# Patient Record
Sex: MALE | Race: WHITE | NOT HISPANIC OR LATINO | Employment: OTHER | URBAN - METROPOLITAN AREA
[De-identification: names, ages, dates, MRNs, and addresses within clinical notes are randomized per-mention and may not be internally consistent; named-entity substitution may affect disease eponyms.]

---

## 2018-09-13 ENCOUNTER — HOSPITAL ENCOUNTER (OUTPATIENT)
Dept: FAMILY MEDICINE CLINIC | Facility: CLINIC | Age: 63
Discharge: HOME | End: 2018-09-13
Attending: FAMILY MEDICINE | Admitting: FAMILY MEDICINE

## 2021-03-23 ENCOUNTER — OFFICE VISIT (OUTPATIENT)
Dept: FAMILY MEDICINE CLINIC | Facility: CLINIC | Age: 66
End: 2021-03-23

## 2021-03-23 VITALS
BODY MASS INDEX: 26.63 KG/M2 | WEIGHT: 190.2 LBS | HEIGHT: 71 IN | DIASTOLIC BLOOD PRESSURE: 80 MMHG | SYSTOLIC BLOOD PRESSURE: 169 MMHG | TEMPERATURE: 97.5 F | HEART RATE: 66 BPM | OXYGEN SATURATION: 98 %

## 2021-03-23 DIAGNOSIS — Z80.42 FH: PROSTATE CANCER: ICD-10-CM

## 2021-03-23 DIAGNOSIS — J30.1 SEASONAL ALLERGIC RHINITIS DUE TO POLLEN: Primary | ICD-10-CM

## 2021-03-23 DIAGNOSIS — R03.0 ELEVATED BLOOD PRESSURE READING WITHOUT DIAGNOSIS OF HYPERTENSION: ICD-10-CM

## 2021-03-23 DIAGNOSIS — N13.8 BENIGN PROSTATIC HYPERPLASIA WITH URINARY OBSTRUCTION: ICD-10-CM

## 2021-03-23 DIAGNOSIS — N50.9 TESTICLE TROUBLE: ICD-10-CM

## 2021-03-23 DIAGNOSIS — N40.1 BENIGN PROSTATIC HYPERPLASIA WITH URINARY OBSTRUCTION: ICD-10-CM

## 2021-03-23 DIAGNOSIS — N32.9 BLADDER PROBLEM: ICD-10-CM

## 2021-03-23 DIAGNOSIS — Z12.11 COLON CANCER SCREENING: ICD-10-CM

## 2021-03-23 PROCEDURE — 99204 OFFICE O/P NEW MOD 45 MIN: CPT | Performed by: FAMILY MEDICINE

## 2021-03-23 RX ORDER — TERAZOSIN 5 MG/1
5 CAPSULE ORAL NIGHTLY
COMMUNITY
Start: 2021-01-20

## 2021-03-23 RX ORDER — LEVOCETIRIZINE DIHYDROCHLORIDE 5 MG/1
5 TABLET, FILM COATED ORAL EVERY EVENING
COMMUNITY

## 2021-03-23 RX ORDER — MULTIPLE VITAMINS W/ MINERALS TAB 9MG-400MCG
1 TAB ORAL DAILY
COMMUNITY
Start: 2018-03-30

## 2021-03-23 RX ORDER — MONTELUKAST SODIUM 10 MG/1
10 TABLET ORAL NIGHTLY
Qty: 90 TABLET | Refills: 1 | Status: SHIPPED | OUTPATIENT
Start: 2021-03-23 | End: 2022-04-05 | Stop reason: SDUPTHER

## 2021-03-23 NOTE — PROGRESS NOTES
Subjective   Avila Pimentel is a 65 y.o. male.   Chief Complaint   Patient presents with   • Sinusitis       History of Present Illness   Patient presents as a new patient. Patient states he has a lot of throat drainage. Patient coughs up clear phlegm.  Above reviewed and verified.  His last doctor was Dr. Veras.  Last saw her 2 and half years ago.  He continues to follow annually with Dr. Kincaid because of BPH and some other bladder problems I am not fully clear on.  Both his father and brother had prostate cancer so he is screened routinely by Dr. Kincaid for that.    He probably has borderline hypertension.  He has been told before his blood pressure was up, but it was not anything that he should treat yet.    His left testicle is slightly larger than the right.  It has been that way for 20 years he says.  Does not really bother him except sometimes the left one aches.    Nasal congestion, throat drainage and coughing.  This is been a problem for a long time.  [place] the symptoms improve quite a bit although they do not fully resolved.  He takes Xyzal daily.    He has never had a colonoscopy.      Patient Active Problem List    Diagnosis Date Noted   • Bladder problem 03/23/2021     Note Last Updated: 3/23/2021     Treated by DR. Kincaid - rare problem - fat inside the body where it's not supposed to be.     • FH: prostate cancer 03/23/2021     Note Last Updated: 3/23/2021     Father and brother.     • Seasonal allergic rhinitis due to pollen 03/23/2021   • Elevated blood pressure reading without diagnosis of hypertension 03/23/2021   • Benign prostatic hyperplasia with urinary obstruction 11/08/2013     Note Last Updated: 3/23/2021     Yearly checks with DR. Kincaid - last PSA was 2.7.    Had a biopsy once - it was negative.             Past Surgical History:   Procedure Laterality Date   • BLADDER SURGERY       Current Outpatient Medications on File Prior to Visit   Medication Sig   •  "levocetirizine (XYZAL) 5 MG tablet Take 5 mg by mouth Every Evening.   • multivitamin with minerals (Multivitamin Adults 50+) tablet tablet MULTIVITAMIN ADULTS 50+ TABS   • terazosin (HYTRIN) 5 MG capsule      No current facility-administered medications on file prior to visit.     No Known Allergies  Social History     Socioeconomic History   • Marital status:      Spouse name: Not on file   • Number of children: Not on file   • Years of education: Not on file   • Highest education level: Not on file   Tobacco Use   • Smoking status: Never Smoker   • Smokeless tobacco: Never Used     History reviewed. No pertinent family history.    Review of Systems    Objective   /80 (BP Location: Left arm, Patient Position: Sitting, Cuff Size: Adult)   Pulse 66   Temp 97.5 °F (36.4 °C) (Infrared)   Ht 180.3 cm (71\")   Wt 86.3 kg (190 lb 3.2 oz)   SpO2 98%   BMI 26.53 kg/m²   Physical Exam  Constitutional:       General: He is not in acute distress.     Appearance: He is well-developed.      Comments: Wearing a face mask     HENT:      Head: Normocephalic and atraumatic.      Nose: Congestion present.   Eyes:      Conjunctiva/sclera: Conjunctivae normal.   Cardiovascular:      Rate and Rhythm: Normal rate and regular rhythm.      Heart sounds: No murmur heard.     Pulmonary:      Effort: Pulmonary effort is normal. No respiratory distress.      Breath sounds: Normal breath sounds.   Genitourinary:     Comments: Left testicle does appear to be slightly larger than the right.  There appears to be a little bit of fluid surrounding it as well.  Both testicles are descended.  There are no visible abnormalities on the scrotum.  No hard palpable masses within either testicle.  Musculoskeletal:         General: Normal range of motion.      Cervical back: Normal range of motion.      Right lower leg: No edema.      Left lower leg: No edema.   Skin:     General: Skin is warm and dry.      Findings: No rash. "   Neurological:      Mental Status: He is alert and oriented to person, place, and time.   Psychiatric:         Behavior: Behavior normal.           No visits with results within 4 Month(s) from this visit.   Latest known visit with results is:   Office Visit Converted on 03/30/2018   Component Date Value Ref Range Status   • Albumin 03/30/2018 4.3  3.6 - 5.1 g/dL Final   • Alkaline Phosphatase 03/30/2018 58  40 - 115 units/L Final   • Basophil Rel % 03/30/2018 1  % Final   • Glucose 03/30/2018 105* 65 - 99 mg/dL Final   • Total Bilirubin 03/30/2018 0.4  0.2 - 1.2 mg/dL Final   • BUN 03/30/2018 26* 7 - 25 mg/dL Final   • BUN/Creatinine Ratio 03/30/2018 23.6 (calc)* 6 - 22 Final   • Calcium 03/30/2018 9.9  8.6 - 10.2 mg/dL Final   • Chloride 03/30/2018 105  98 - 110 mmol/L Final   • Chol/HDL Ratio 03/30/2018 3.6 (calc)  <5.0 Final   • Total Cholesterol 03/30/2018 180  <200 mg/dL Final   • CO2 03/30/2018 28  21 - 33 mmol/L Final   • Creatinine 03/30/2018 1.1  0.76 - 1.46 mg/dL Final   • eGFR African Am 03/30/2018 >60 mL/min/1.73m2  >59 mL/min/1.73m2 Final   • eGFR Non African Am 03/30/2018 >60 mL/min/1.73m2  >59 mL/min/1.73m2 Final   • Eosinophils Absolute 03/30/2018 100 CELLS/UL  15 - 500 10*3/mm3 Final   • Eosinophil Rel % 03/30/2018 1  % Final   • Globulin 03/30/2018 2.6 G/DL (CALC)  2.1 - 3.7 g/dL Final   • Hematocrit 03/30/2018 48.1  38.5 - 50.0 % Final   • HDL Cholesterol 03/30/2018 50  >40 mg/dL Final   • Hemoglobin 03/30/2018 16.0  13.2 - 17.1 g/dL Final   • LDL Cholesterol  03/30/2018 108 MG/DL (CALC)* mg/dL Final   • Lymphocytes Absolute 03/30/2018 1400 CELLS/UL  850 - 3900 10*3/mm3 Final   • Lymphocyte Rel % 03/30/2018 25  % Final   • MCH 03/30/2018 30.0  27.0 - 33.0 pg Final   • MCHC 03/30/2018 33.3 G/DL  32.0 - 36.0 % Final   • MCV 03/30/2018 90.2  80.0 - 100.0 fL Final   • Monocyte Rel % 03/30/2018 6  % Final   • Monocytes Absolute 03/30/2018 400 CELLS/UL  200 - 950 10*3/microliter Final   • MPV  03/30/2018 8.3  7.5 - 11.5 fL Final   • Neutrophils Absolute 03/30/2018 3900 CELLS/UL  1500 - 7800 10*3/mm3 Final   • Platelets 03/30/2018 257 THOUSAND/UL  140 - 400 10*3/mm3 Final   • Neutrophils, Fluid 03/30/2018 67  % Final   • Potassium 03/30/2018 4.8  3.5 - 5.3 mmol/L Final   • Total Protein 03/30/2018 6.9  6.2 - 8.3 g/dL Final   • PSA 03/30/2018 2.2  < OR = 4.0 ng/mL Final   • RBC 03/30/2018 5.34 MILLION/UL  4.20 - 5.80 10*6/mm3 Final   • RDW 03/30/2018 12.5  11.0 - 15.0 % Final   • AST (SGOT) 03/30/2018 25  10 - 35 units/L Final   • ALT (SGPT) 03/30/2018 23  9 - 60 units/L Final   • Sodium 03/30/2018 140  135 - 146 mmol/L Final   • Triglycerides 03/30/2018 112  <150 mg/dL Final   • WBC 03/30/2018 5.7 THOUSAND/UL  3.8 - 10.8 10*3/mm3 Final   • A/G Ratio 03/30/2018 1.7 (calc)  1.0 - 2.1 Final         Assessment/Plan   Diagnoses and all orders for this visit:    1. Seasonal allergic rhinitis due to pollen (Primary)  -     montelukast (Singulair) 10 MG tablet; Take 1 tablet by mouth Every Night.  Dispense: 90 tablet; Refill: 1    2. Elevated blood pressure reading without diagnosis of hypertension    3. Benign prostatic hyperplasia with urinary obstruction    4. FH: prostate cancer    5. Bladder problem    6. Testicle trouble    7. Colon cancer screening  -     Ambulatory Referral For Screening Colonoscopy    Will add Singulair to his daily regimen of Xyzal.  If symptoms not controlled he can start over-the-counter Nasacort.  If symptoms are still not controlled, then he should add temporarily Mucinex and use of a Pigeon pot.  Asymmetrical testicles-I think he has a little fluid around the left testicle.  Possibly a small hydrocele.  He is not ready to do an ultrasound or have surgery on this.  If it bothers her more he will let me know.  Elevated blood pressure-get a blood pressure monitor and check his blood pressure at home 2-3 times a week.  If numbers stay above 140/90 it will be time to treat that.  We will  plan on getting blood work when he comes back in for blood pressure evaluation.  Prostate problems-keep follow-up with Dr. Kincaid per his instructions.  We will make a referral for screening colonoscopy.  Follow-up here at least in 1 year.  If we do not see him before then, we will get labs at next visit presuming we see him sooner, will get labs at that time.           Return in about 1 year (around 3/23/2022), or if symptoms worsen or fail to improve.    Call with any problems or concerns before next visit

## 2021-05-21 ENCOUNTER — OFFICE (AMBULATORY)
Dept: URBAN - METROPOLITAN AREA PATHOLOGY 4 | Facility: PATHOLOGY | Age: 66
End: 2021-05-21
Payer: COMMERCIAL

## 2021-05-21 ENCOUNTER — OFFICE (AMBULATORY)
Dept: URBAN - METROPOLITAN AREA PATHOLOGY 4 | Facility: PATHOLOGY | Age: 66
End: 2021-05-21

## 2021-05-21 ENCOUNTER — ON CAMPUS - OUTPATIENT (AMBULATORY)
Dept: URBAN - METROPOLITAN AREA HOSPITAL 2 | Facility: HOSPITAL | Age: 66
End: 2021-05-21

## 2021-05-21 VITALS
HEART RATE: 58 BPM | HEIGHT: 71 IN | RESPIRATION RATE: 18 BRPM | HEART RATE: 60 BPM | TEMPERATURE: 97.1 F | DIASTOLIC BLOOD PRESSURE: 71 MMHG | SYSTOLIC BLOOD PRESSURE: 118 MMHG | HEART RATE: 70 BPM | WEIGHT: 186 LBS | SYSTOLIC BLOOD PRESSURE: 114 MMHG | SYSTOLIC BLOOD PRESSURE: 107 MMHG | DIASTOLIC BLOOD PRESSURE: 67 MMHG | DIASTOLIC BLOOD PRESSURE: 92 MMHG | SYSTOLIC BLOOD PRESSURE: 113 MMHG | RESPIRATION RATE: 16 BRPM | DIASTOLIC BLOOD PRESSURE: 75 MMHG | OXYGEN SATURATION: 95 % | SYSTOLIC BLOOD PRESSURE: 116 MMHG | SYSTOLIC BLOOD PRESSURE: 146 MMHG | HEART RATE: 64 BPM | DIASTOLIC BLOOD PRESSURE: 84 MMHG | HEART RATE: 65 BPM | SYSTOLIC BLOOD PRESSURE: 129 MMHG | HEART RATE: 62 BPM | OXYGEN SATURATION: 100 % | OXYGEN SATURATION: 93 % | DIASTOLIC BLOOD PRESSURE: 64 MMHG | DIASTOLIC BLOOD PRESSURE: 72 MMHG | OXYGEN SATURATION: 99 % | HEART RATE: 54 BPM | OXYGEN SATURATION: 98 % | OXYGEN SATURATION: 96 %

## 2021-05-21 DIAGNOSIS — K57.30 DIVERTICULOSIS OF LARGE INTESTINE WITHOUT PERFORATION OR ABS: ICD-10-CM

## 2021-05-21 DIAGNOSIS — K64.1 SECOND DEGREE HEMORRHOIDS: ICD-10-CM

## 2021-05-21 DIAGNOSIS — D12.0 BENIGN NEOPLASM OF CECUM: ICD-10-CM

## 2021-05-21 DIAGNOSIS — K63.89 OTHER SPECIFIED DISEASES OF INTESTINE: ICD-10-CM

## 2021-05-21 DIAGNOSIS — Z12.11 ENCOUNTER FOR SCREENING FOR MALIGNANT NEOPLASM OF COLON: ICD-10-CM

## 2021-05-21 PROBLEM — K63.5 POLYP OF COLON: Status: ACTIVE | Noted: 2021-05-21

## 2021-05-21 LAB
GI HISTOLOGY: A. UNSPECIFIED: (no result)
GI HISTOLOGY: PDF REPORT: (no result)

## 2021-05-21 PROCEDURE — 88305 TISSUE EXAM BY PATHOLOGIST: CPT | Mod: 26 | Performed by: INTERNAL MEDICINE

## 2021-05-21 PROCEDURE — 45385 COLONOSCOPY W/LESION REMOVAL: CPT | Mod: PT | Performed by: INTERNAL MEDICINE

## 2022-04-05 ENCOUNTER — TELEPHONE (OUTPATIENT)
Dept: FAMILY MEDICINE CLINIC | Facility: CLINIC | Age: 67
End: 2022-04-05

## 2022-04-05 ENCOUNTER — OFFICE VISIT (OUTPATIENT)
Dept: FAMILY MEDICINE CLINIC | Facility: CLINIC | Age: 67
End: 2022-04-05

## 2022-04-05 VITALS
OXYGEN SATURATION: 97 % | SYSTOLIC BLOOD PRESSURE: 132 MMHG | HEART RATE: 83 BPM | TEMPERATURE: 98.4 F | DIASTOLIC BLOOD PRESSURE: 80 MMHG

## 2022-04-05 DIAGNOSIS — J30.1 SEASONAL ALLERGIC RHINITIS DUE TO POLLEN: ICD-10-CM

## 2022-04-05 DIAGNOSIS — J01.00 ACUTE NON-RECURRENT MAXILLARY SINUSITIS: Primary | ICD-10-CM

## 2022-04-05 DIAGNOSIS — R05.9 COUGH: ICD-10-CM

## 2022-04-05 PROCEDURE — 99213 OFFICE O/P EST LOW 20 MIN: CPT | Performed by: NURSE PRACTITIONER

## 2022-04-05 RX ORDER — BROMPHENIRAMINE MALEATE, PSEUDOEPHEDRINE HYDROCHLORIDE, AND DEXTROMETHORPHAN HYDROBROMIDE 2; 30; 10 MG/5ML; MG/5ML; MG/5ML
5 SYRUP ORAL 4 TIMES DAILY PRN
Qty: 75 ML | Refills: 0 | Status: SHIPPED | OUTPATIENT
Start: 2022-04-05 | End: 2023-02-17

## 2022-04-05 RX ORDER — AMOXICILLIN AND CLAVULANATE POTASSIUM 875; 125 MG/1; MG/1
1 TABLET, FILM COATED ORAL 2 TIMES DAILY
Qty: 14 TABLET | Refills: 0 | Status: SHIPPED | OUTPATIENT
Start: 2022-04-05 | End: 2023-02-17

## 2022-04-05 RX ORDER — MONTELUKAST SODIUM 10 MG/1
10 TABLET ORAL NIGHTLY
Qty: 90 TABLET | Refills: 1 | Status: SHIPPED | OUTPATIENT
Start: 2022-04-05 | End: 2023-02-17

## 2022-04-05 NOTE — PROGRESS NOTES
Chief Complaint  Sinusitis    Subjective          Avila Pimentel presents to [facility]      History of Present Illness    Avila is a 66-year-old patient who presents today with acute sinusitis symptoms.  He reports that his symptoms began Friday with productive yellow cough, headache, and sinus pressure.  He denies fevers, chills, N/V/D, or shortness of breath.  He has taken several over-the-counter medications without any relief.  Additionally, he has been out of his Singulair.    Patient was tested for flu and Covid and both were negative.    Objective     Vital Signs:   /80 (BP Location: Left arm, Patient Position: Sitting, Cuff Size: Adult)   Pulse 83   Temp 98.4 °F (36.9 °C) (Oral)   SpO2 97%           Physical Exam  Vitals reviewed.   Constitutional:       Appearance: Normal appearance. He is well-developed.      Comments: Wearing a face mask     HENT:      Head: Normocephalic and atraumatic.      Right Ear: Tympanic membrane, ear canal and external ear normal.      Left Ear: Tympanic membrane, ear canal and external ear normal.      Nose: Septal deviation, congestion and rhinorrhea present. Rhinorrhea is purulent.      Right Turbinates: Enlarged.      Left Turbinates: Enlarged.      Right Sinus: Maxillary sinus tenderness and frontal sinus tenderness present.      Left Sinus: Maxillary sinus tenderness and frontal sinus tenderness present.      Mouth/Throat:      Mouth: Mucous membranes are moist.      Pharynx: Oropharynx is clear.      Comments: Thick PND noted    Eyes:      Conjunctiva/sclera: Conjunctivae normal.   Cardiovascular:      Rate and Rhythm: Normal rate and regular rhythm.      Pulses: Normal pulses.      Heart sounds: Normal heart sounds.   Pulmonary:      Effort: Pulmonary effort is normal. No respiratory distress.      Breath sounds: Normal breath sounds.   Musculoskeletal:         General: Normal range of motion.      Cervical back: Normal  range of motion and neck supple. No tenderness.   Skin:     General: Skin is warm and dry.      Findings: No rash.   Neurological:      Mental Status: He is alert and oriented to person, place, and time.   Psychiatric:         Behavior: Behavior normal.                Result Review :                                   Assessment and Plan      Diagnoses and all orders for this visit:    1. Acute non-recurrent maxillary sinusitis (Primary)    2. Seasonal allergic rhinitis due to pollen  -     montelukast (Singulair) 10 MG tablet; Take 1 tablet by mouth Every Night.  Dispense: 90 tablet; Refill: 1    3. Cough    Other orders  -     amoxicillin-clavulanate (Augmentin) 875-125 MG per tablet; Take 1 tablet by mouth 2 (Two) Times a Day.  Dispense: 14 tablet; Refill: 0  -     brompheniramine-pseudoephedrine-DM 30-2-10 MG/5ML syrup; Take 5 mL by mouth 4 (Four) Times a Day As Needed for Allergies.  Dispense: 75 mL; Refill: 0            Follow Up       No follow-ups on file.      Patient was given instructions and counseling regarding his condition or for health maintenance advice. Please see specific information pulled into the AVS if appropriate.     Carol Pantoja, APRN4/5/202214:39 EDT  This note has been electronically signed

## 2022-04-05 NOTE — TELEPHONE ENCOUNTER
CALLED PATIENT AND LET THEM KNOW THAT THEY NEED TO BE SEEN SO I MADE HER AN APPOINTMENT TODAY TO SEE MERVIN BELLO

## 2022-04-05 NOTE — TELEPHONE ENCOUNTER
Caller: ROSSI WEEKS    Relationship: Emergency Contact    Best call back number: [phone] (M)    What medication are you requesting ANTIBIOTIC :SINUS INFECTION     What are your current symptoms: COUGH & CONGESTION    How long have you been experiencing symptoms:      Have you had these symptoms before:    [] Yes  [] No    Have you been treated for these symptoms before:   [] Yes  [] No    If a prescription is needed, what is your preferred pharmacy and phone number:  [id number] - [place], [place] - [address] AT [address] - [phone] [facility] [phone] FX        Additional notes: PLEASE CONTACT ROSSI TO ADVISE.         THANKS

## 2023-02-17 ENCOUNTER — TELEPHONE (OUTPATIENT)
Dept: FAMILY MEDICINE CLINIC | Facility: CLINIC | Age: 68
End: 2023-02-17
Payer: MEDICARE

## 2023-02-17 RX ORDER — TIZANIDINE 4 MG/1
4 TABLET ORAL EVERY 8 HOURS PRN
Qty: 21 TABLET | Refills: 0 | Status: SHIPPED | OUTPATIENT
Start: 2023-02-17 | End: 2023-03-15

## 2023-02-17 RX ORDER — IBUPROFEN 600 MG/1
600 TABLET ORAL EVERY 6 HOURS PRN
COMMUNITY
Start: 2023-02-10 | End: 2023-02-17 | Stop reason: SDUPTHER

## 2023-02-17 RX ORDER — METHOCARBAMOL 500 MG/1
500 TABLET, FILM COATED ORAL EVERY 8 HOURS PRN
Qty: 21 TABLET | Refills: 1 | Status: SHIPPED | OUTPATIENT
Start: 2023-02-17 | End: 2023-02-17

## 2023-02-17 RX ORDER — IBUPROFEN 600 MG/1
600 TABLET ORAL EVERY 6 HOURS PRN
Qty: 28 TABLET | Refills: 1 | Status: SHIPPED | OUTPATIENT
Start: 2023-02-17 | End: 2023-03-15

## 2023-02-17 RX ORDER — METHOCARBAMOL 500 MG/1
500 TABLET, FILM COATED ORAL EVERY 8 HOURS PRN
COMMUNITY
Start: 2023-02-10 | End: 2023-02-17 | Stop reason: SDUPTHER

## 2023-02-17 NOTE — TELEPHONE ENCOUNTER
Pt fell down a flight of stairs on 2/10/23.  Was taken to [facility].  (I have discharge summary, put in folder)  The released w/ Ibuprofen 600mg Q 6hrs and Methocarbamol 500mg Q 8 hrs.  Pt has appt on Tues, 2/21/23.  Pt wants to know if he can have refills of both medications until he is seen.  Please advise.      alana Collins

## 2023-02-17 NOTE — TELEPHONE ENCOUNTER
Please let them know that I have refilled these medications.  I look forward to seeing him on Tuesday.  Thanks!

## 2023-02-21 ENCOUNTER — OFFICE VISIT (OUTPATIENT)
Dept: FAMILY MEDICINE CLINIC | Facility: CLINIC | Age: 68
End: 2023-02-21
Payer: MEDICARE

## 2023-02-21 VITALS
DIASTOLIC BLOOD PRESSURE: 88 MMHG | BODY MASS INDEX: 28.61 KG/M2 | HEIGHT: 71 IN | SYSTOLIC BLOOD PRESSURE: 170 MMHG | TEMPERATURE: 97.7 F | OXYGEN SATURATION: 96 % | WEIGHT: 204.4 LBS | HEART RATE: 69 BPM

## 2023-02-21 DIAGNOSIS — S22.42XS CLOSED FRACTURE OF MULTIPLE RIBS OF LEFT SIDE, SEQUELA: ICD-10-CM
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
## 2023-02-21 DIAGNOSIS — S42.115S CLOSED NONDISPLACED FRACTURE OF BODY OF LEFT SCAPULA, SEQUELA: ICD-10-CM

## 2023-02-21 DIAGNOSIS — M25.552 LEFT HIP PAIN: ICD-10-CM

## 2023-02-21 PROBLEM — S42.115A CLOSED NONDISPLACED FRACTURE OF BODY OF LEFT SCAPULA: Status: ACTIVE | Noted: 2023-02-21
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
## 2023-02-21 PROCEDURE — 99214 OFFICE O/P EST MOD 30 MIN: CPT | Performed by: FAMILY MEDICINE

## 2023-02-21 RX ORDER — AZITHROMYCIN 250 MG/1
TABLET, FILM COATED ORAL
Qty: 6 TABLET | Refills: 0 | Status: SHIPPED | OUTPATIENT
Start: 2023-02-21 | End: 2023-03-01

## 2023-02-21 RX ORDER — HYDROCODONE BITARTRATE AND ACETAMINOPHEN 5; 325 MG/1; MG/1
1 TABLET ORAL EVERY 4 HOURS PRN
Qty: 30 TABLET | Refills: 0 | Status: SHIPPED | OUTPATIENT
Start: 2023-02-21 | End: 2023-03-15

## 2023-02-21 RX ORDER — GUAIFENESIN 600 MG/1
600 TABLET, EXTENDED RELEASE ORAL 2 TIMES DAILY
Qty: 14 TABLET | Refills: 0 | Status: SHIPPED | OUTPATIENT
Start: 2023-02-21
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
## 2023-03-01 ENCOUNTER — OFFICE VISIT (OUTPATIENT)
Dept: FAMILY MEDICINE CLINIC | Facility: CLINIC | Age: 68
End: 2023-03-01
Payer: MEDICARE

## 2023-03-01 VITALS
TEMPERATURE: 97.6 F | HEART RATE: 76 BPM | WEIGHT: 194.6 LBS | SYSTOLIC BLOOD PRESSURE: 144 MMHG | BODY MASS INDEX: 27.24 KG/M2 | RESPIRATION RATE: 16 BRPM | DIASTOLIC BLOOD PRESSURE: 84 MMHG | OXYGEN SATURATION: 96 % | HEIGHT: 71 IN
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
## 2023-03-01 DIAGNOSIS — S22.42XD CLOSED FRACTURE OF MULTIPLE RIBS OF LEFT SIDE WITH ROUTINE HEALING, SUBSEQUENT ENCOUNTER: ICD-10-CM

## 2023-03-01 DIAGNOSIS — S42.115D CLOSED NONDISPLACED FRACTURE OF BODY OF LEFT SCAPULA WITH ROUTINE HEALING, SUBSEQUENT ENCOUNTER: ICD-10-CM

## 2023-03-01 PROBLEM — R05.9 COUGH: Status: RESOLVED | Noted: 2022-04-05 | Resolved: 2023-03-01

## 2023-03-01 PROBLEM — J30.1 SEASONAL ALLERGIC RHINITIS DUE TO POLLEN: Status: RESOLVED | Noted: 2021-03-23 | Resolved: 2023-03-01

## 2023-03-01 PROBLEM — J01.00 ACUTE NON-RECURRENT MAXILLARY SINUSITIS: Status: RESOLVED | Noted: 2022-04-05 | Resolved: 2023-03-01

## 2023-03-01 PROCEDURE — 99213 OFFICE O/P EST LOW 20 MIN: CPT | Performed by: FAMILY MEDICINE

## 2023-03-01 NOTE — PROGRESS NOTES
Answers for HPI/ROS submitted by the patient on 2/28/2023  What is the primary reason for your visit?: Other  Please describe your symptoms.: Broken ribs and shoulder blade  Have you had these symptoms before?: Yes  How long have you been having these symptoms?: Greater than 2 weeks  Please list any medications you are currently taking for this condition.: Hydrocodone/acetaminophen 5-325TB, Guaifenesin 600mg ER Tablets  Please describe any probable cause for these symptoms. : Fell down flight of stairs    Scott Pimentel is a 67 y.o. male.   Chief Complaint   Patient presents with   • Rib Injury       History of Present Illness   Presents to the office today for follow-up on a recent injury.  Please see last note for details.  I saw him about 9 days ago.  Fell down the stairs in the dark.  Suffered a left scapular fracture and multiple left rib fractures.  Scapular fracture was not visible on the x-ray of the scapula we did last week.  Our x-ray suggested displaced left fourth through eighth rib fractures with small left pleural effusion.  We x-rayed his hip and there is no obvious fracture there.  He was having significant pain so I gave him some hydrocodone, we talked about cough and deep breathing exercises.  He has been doing the cough and deep breathing exercises routinely.  He had an incentive spirometer and uses that frequently as well.  He reports a general slow decrease in the left chest wall pain.  That bothers him a lot more than his left upper back pain.  In fact, unless he leans back real hard against something, it hardly bothers him.  He tells me he is able to get in and out of bed much easier.  More trouble getting out than in, but if he holds his chest and rolls out he is able to do it easily.  He can get in and out of a chair in a car much easier.  He has not had any worsening of the pain, no sudden shortness of breath.    He and his wife have tickets to a concert in 2 weeks and he  asks if I think he will be able to go.      Patient Active Problem List    Diagnosis Date Noted   • Fall down stairs 02/21/2023   • Closed nondisplaced fracture of body of left scapula 02/21/2023   • Bladder problem 03/23/2021     Note Last Updated: 3/23/2021     Treated by DR. Kincaid - rare problem - fat inside the body where it's not supposed to be.     • FH: prostate cancer 03/23/2021     Note Last Updated: 3/23/2021     Father and brother.     • Elevated blood pressure reading without diagnosis of hypertension 03/23/2021   • Benign prostatic hyperplasia with urinary obstruction 11/08/2013     Note Last Updated: 3/23/2021     Yearly checks with DR. Kincaid - last PSA was 2.7.    Had a biopsy once - it was negative.             Past Surgical History:   Procedure Laterality Date   • BLADDER SURGERY     • COLONOSCOPY W/ BIOPSIES  05/21/2021    Dr. Null - diverticula, polyp (tubular adenomaon path), scarring - descending colon.  int & Ext hemorrhoids   • VASECTOMY  1985     Current Outpatient Medications on File Prior to Visit   Medication Sig   • guaiFENesin (Mucinex) 600 MG 12 hr tablet Take 1 tablet by mouth 2 (Two) Times a Day.   • multivitamin with minerals tablet tablet Take 1 tablet by mouth Daily.   • terazosin (HYTRIN) 5 MG capsule Take 1 capsule by mouth Every Night.   • levocetirizine (XYZAL) 5 MG tablet Take 1 tablet by mouth Every Evening.     No current facility-administered medications on file prior to visit.     No Known Allergies  Social History     Socioeconomic History   • Marital status:    Tobacco Use   • Smoking status: Never     Passive exposure: Never   • Smokeless tobacco: Never   Vaping Use   • Vaping Use: Never used   Substance and Sexual Activity   • Alcohol use: Never   • Drug use: Never   • Sexual activity: Yes     Partners: Female     Birth control/protection: Post-menopausal     Family History   Problem Relation Age of Onset   • Cancer Father         Prostate   • Cancer Brother  "        Prostate       Review of Systems    Objective   /84 (BP Location: Right arm, Patient Position: Sitting, Cuff Size: Adult)   Pulse 76   Temp 97.6 °F (36.4 °C) (Infrared)   Resp 16   Ht 180.3 cm (71\")   Wt 88.3 kg (194 lb 9.6 oz)   SpO2 96%   BMI 27.14 kg/m²   Physical Exam  Constitutional:       Appearance: He is well-developed. He is not toxic-appearing.      Comments: Wearing a face mask     HENT:      Head: Normocephalic and atraumatic.   Eyes:      Conjunctiva/sclera: Conjunctivae normal.   Cardiovascular:      Rate and Rhythm: Normal rate.   Pulmonary:      Effort: Pulmonary effort is normal. No respiratory distress.      Breath sounds: No stridor. No wheezing, rhonchi or rales.      Comments: He has tenderness to palpation over the left hemithorax, but he is not as sore as he was at the last visit.  He tends to hold the left side of his chest through most of the visit.  Breath sounds are equal in bilateral lung fields.  Musculoskeletal:         General: Normal range of motion.      Cervical back: Normal range of motion.      Right lower leg: No edema.      Left lower leg: No edema.      Comments: Some tenderness to palpation on the left upper back.  No obvious physical bruising.   Skin:     General: Skin is warm and dry.      Findings: No rash.   Neurological:      Mental Status: He is alert and oriented to person, place, and time.      Gait: Gait normal.   Psychiatric:         Mood and Affect: Mood normal.         Behavior: Behavior normal.           No visits with results within 4 Month(s) from this visit.   Latest known visit with results is:   Office Visit Converted on 03/30/2018   Component Date Value Ref Range Status   • Albumin 03/30/2018 4.3  3.6 - 5.1 g/dL Final   • Alkaline Phosphatase 03/30/2018 58  40 - 115 units/L Final   • Basophil Rel % 03/30/2018 1  % Final   • Glucose 03/30/2018 105 (H)  65 - 99 mg/dL Final   • Total Bilirubin 03/30/2018 0.4  0.2 - 1.2 mg/dL Final   • BUN " 03/30/2018 26 (H)  7 - 25 mg/dL Final   • BUN/Creatinine Ratio 03/30/2018 23.6 (calc) (H)  6 - 22 Final   • Calcium 03/30/2018 9.9  8.6 - 10.2 mg/dL Final   • Chloride 03/30/2018 105  98 - 110 mmol/L Final   • Chol/HDL Ratio 03/30/2018 3.6 (calc)  <5.0 Final   • Total Cholesterol 03/30/2018 180  <200 mg/dL Final   • CO2 03/30/2018 28  21 - 33 mmol/L Final   • Creatinine 03/30/2018 1.1  0.76 - 1.46 mg/dL Final   • eGFR African Am 03/30/2018 >60 mL/min/1.73m2  >59 mL/min/1.73m2 Final   • eGFR Non African Am 03/30/2018 >60 mL/min/1.73m2  >59 mL/min/1.73m2 Final   • Eosinophils Absolute 03/30/2018 100 CELLS/UL  15 - 500 10*3/mm3 Final   • Eosinophil Rel % 03/30/2018 1  % Final   • Globulin 03/30/2018 2.6 G/DL (CALC)  2.1 - 3.7 g/dL Final   • Hematocrit 03/30/2018 48.1  38.5 - 50.0 % Final   • HDL Cholesterol 03/30/2018 50  >40 mg/dL Final   • Hemoglobin 03/30/2018 16.0  13.2 - 17.1 g/dL Final   • LDL Cholesterol  03/30/2018 108 MG/DL (CALC) (H)  mg/dL Final   • Lymphocytes Absolute 03/30/2018 1400 CELLS/UL  850 - 3900 10*3/mm3 Final   • Lymphocyte Rel % 03/30/2018 25  % Final   • MCH 03/30/2018 30.0  27.0 - 33.0 pg Final   • MCHC 03/30/2018 33.3 G/DL  32.0 - 36.0 % Final   • MCV 03/30/2018 90.2  80.0 - 100.0 fL Final   • Monocyte Rel % 03/30/2018 6  % Final   • Monocytes Absolute 03/30/2018 400 CELLS/UL  200 - 950 10*3/microliter Final   • MPV 03/30/2018 8.3  7.5 - 11.5 fL Final   • Neutrophils Absolute 03/30/2018 3900 CELLS/UL  1500 - 7800 10*3/mm3 Final   • Platelets 03/30/2018 257 THOUSAND/UL  140 - 400 10*3/mm3 Final   • Neutrophils, Fluid 03/30/2018 67  % Final   • Potassium 03/30/2018 4.8  3.5 - 5.3 mmol/L Final   • Total Protein 03/30/2018 6.9  6.2 - 8.3 g/dL Final   • PSA 03/30/2018 2.2  < OR = 4.0 ng/mL Final   • RBC 03/30/2018 5.34 MILLION/UL  4.20 - 5.80 10*6/mm3 Final   • RDW 03/30/2018 12.5  11.0 - 15.0 % Final   • AST (SGOT) 03/30/2018 25  10 - 35 units/L Final   • ALT (SGPT) 03/30/2018 23  9 - 60 units/L  Final   • Sodium 03/30/2018 140  135 - 146 mmol/L Final   • Triglycerides 03/30/2018 112  <150 mg/dL Final   • WBC 03/30/2018 5.7 THOUSAND/UL  3.8 - 10.8 10*3/mm3 Final   • A/G Ratio 03/30/2018 1.7 (calc)  1.0 - 2.1 Final         Assessment & Plan   Diagnoses and all orders for this visit:    1. Fall down stairs, subsequent encounter (Primary)  -     Cancel: XR Ribs Left With PA Chest  -     XR ribs 2 vw left    2. Closed fracture of multiple ribs of left side with routine healing, subsequent encounter  -     Cancel: XR Ribs Left With PA Chest  -     XR ribs 2 vw left    3. Closed nondisplaced fracture of body of left scapula with routine healing, subsequent encounter  -     Cancel: XR Ribs Left With PA Chest  -     XR ribs 2 vw left    Overall, Avila is moving a lot better than he was the last visit.  We x-rayed his ribs in the office and I reviewed those with him.  There is some displacement, but he has good breath sounds and he does not have a pneumothorax.  Probably still too early for much callusing to take place.  I explained the process of rib fracture healing to him.  Use pain medicine only if needed at this point.  Continue to do incentive spirometry.  Time will heal these fractures.  He needs to primarily protect the areas to avoid reinjury.  We will see him again in 2 weeks to reevaluate and re-x-ray his ribs.  From my standpoint I think it is okay to go to a concert as long as he is careful while stairclimbing and when in crowds.  Overall, I spent just over 20 minutes on the day of service with Avila discussing all the above, reviewing the images of the x-ray with him and discussing our follow-up plan.        Call with any problems or concerns before next visit       Return in about 2 weeks (around 3/15/2023), or to recheck rib fracture.      Much of this report is an electronic transcription of spoken language to printed text using Dragon dictation software.  As such, the subtleties and finesse of  spoken language may permit erroneous, or at times, nonsensical words or phrases to be inadvertently transcribed; thus changes may be made at a later date to rectify these errors.     Mary Barlow MD3/17/[id number]:15 EDT  This note has been electronically signed

## 2023-03-15 ENCOUNTER — OFFICE VISIT (OUTPATIENT)
Dept: FAMILY MEDICINE CLINIC | Facility: CLINIC | Age: 68
End: 2023-03-15
Payer: MEDICARE

## 2023-03-15 VITALS
SYSTOLIC BLOOD PRESSURE: 132 MMHG | TEMPERATURE: 98 F | OXYGEN SATURATION: 97 % | BODY MASS INDEX: 27.24 KG/M2 | WEIGHT: 194.6 LBS | DIASTOLIC BLOOD PRESSURE: 88 MMHG | HEIGHT: 71 IN | HEART RATE: 82 BPM

## 2023-03-15 DIAGNOSIS — S22.42XD CLOSED FRACTURE OF MULTIPLE RIBS OF LEFT SIDE WITH ROUTINE HEALING, SUBSEQUENT ENCOUNTER: Primary | ICD-10-CM

## 2023-03-15 PROCEDURE — 1159F MED LIST DOCD IN RCRD: CPT | Performed by: FAMILY MEDICINE

## 2023-03-15 PROCEDURE — 99213 OFFICE O/P EST LOW 20 MIN: CPT | Performed by: FAMILY MEDICINE

## 2023-03-15 PROCEDURE — 1160F RVW MEDS BY RX/DR IN RCRD: CPT | Performed by: FAMILY MEDICINE

## 2023-03-15 NOTE — PROGRESS NOTES
Answers for HPI/ROS submitted by the patient on 3/8/2023  What is the primary reason for your visit?: Other  Please describe your symptoms.: Follow up on broken ribs  Have you had these symptoms before?: Yes  How long have you been having these symptoms?: Greater than 2 weeks  Please list any medications you are currently taking for this condition.: Mucinex, Advil  Please describe any probable cause for these symptoms. : Fall down stairs    Scott Pimentel is a 67 y.o. male.   Chief Complaint   Patient presents with   • Rib Injury   • Chest Pain       History of Present Illness   Presents to the office today for follow-up on multiple left rib fractures following a fall down the stairs on February 10.  Please see previous notes for details.  He no longer needs to take the muscle relaxer, anti-inflammatory, hydrocodone.  He is able to move much more comfortably.  He rarely has any pain in his left upper back over his scapula.  He has some minimal tenderness to palpation on the left chest wall.  He has no bruising.    His wife tells me he moves pretty much like normal again.  He is able to get up out of bed in the middle of the night to go to the bathroom and he seems to move comfortably.  He denies any cough, shortness of breath, wheezing.          Patient Active Problem List    Diagnosis Date Noted   • Fall down stairs 02/21/2023   • Closed nondisplaced fracture of body of left scapula 02/21/2023   • Bladder problem 03/23/2021     Note Last Updated: 3/23/2021     Treated by DR. Kincaid - rare problem - fat inside the body where it's not supposed to be.     • FH: prostate cancer 03/23/2021     Note Last Updated: 3/23/2021     Father and brother.     • Elevated blood pressure reading without diagnosis of hypertension 03/23/2021   • Benign prostatic hyperplasia with urinary obstruction 11/08/2013     Note Last Updated: 3/23/2021     Yearly checks with DR. Kincaid - last PSA was 2.7.    Had a biopsy once -  it was negative.             Past Surgical History:   Procedure Laterality Date   • BLADDER SURGERY     • COLONOSCOPY W/ BIOPSIES  05/21/2021    Dr. Null - diverticula, polyp (tubular adenomaon path), scarring - descending colon.  int & Ext hemorrhoids   • VASECTOMY  1985     Current Outpatient Medications on File Prior to Visit   Medication Sig   • guaiFENesin (Mucinex) 600 MG 12 hr tablet Take 1 tablet by mouth 2 (Two) Times a Day.   • levocetirizine (XYZAL) 5 MG tablet Take 1 tablet by mouth Every Evening.   • multivitamin with minerals tablet tablet Take 1 tablet by mouth Daily.   • terazosin (HYTRIN) 5 MG capsule Take 1 capsule by mouth Every Night.   • [DISCONTINUED] HYDROcodone-acetaminophen (Norco) 5-325 MG per tablet Take 1 tablet by mouth Every 4 (Four) Hours As Needed for Moderate Pain.   • [DISCONTINUED] ibuprofen (ADVIL,MOTRIN) 600 MG tablet Take 1 tablet by mouth Every 6 (Six) Hours As Needed for Moderate Pain. for pain   • [DISCONTINUED] Pain Relieving Lidocaine 4 % patch Apply 1 patch topically to the appropriate area as directed Every Other Day.   • [DISCONTINUED] tiZANidine (ZANAFLEX) 4 MG tablet Take 1 tablet by mouth Every 8 (Eight) Hours As Needed for Muscle Spasms.     No current facility-administered medications on file prior to visit.     No Known Allergies  Social History     Socioeconomic History   • Marital status:    Tobacco Use   • Smoking status: Never     Passive exposure: Never   • Smokeless tobacco: Never   Vaping Use   • Vaping Use: Never used   Substance and Sexual Activity   • Alcohol use: Never   • Drug use: Never   • Sexual activity: Yes     Partners: Female     Birth control/protection: Post-menopausal     Family History   Problem Relation Age of Onset   • Cancer Father         Prostate   • Cancer Brother         Prostate       Review of Systems    Objective   /88 (BP Location: Right arm, Patient Position: Sitting, Cuff Size: Adult)   Pulse 82   Temp 98 °F  "(36.7 °C) (Temporal)   Ht 180.3 cm (70.98\")   Wt 88.3 kg (194 lb 9.6 oz)   SpO2 97%   BMI 27.15 kg/m²   Physical Exam  Constitutional:       Appearance: He is well-developed.      Comments: Wearing a face mask     HENT:      Head: Normocephalic and atraumatic.   Eyes:      Conjunctiva/sclera: Conjunctivae normal.   Cardiovascular:      Rate and Rhythm: Normal rate.   Pulmonary:      Effort: Pulmonary effort is normal. No accessory muscle usage, respiratory distress or retractions.      Breath sounds: Normal breath sounds. No decreased air movement. No wheezing, rhonchi or rales.   Chest:      Chest wall: Tenderness present.   Musculoskeletal:         General: Normal range of motion.      Cervical back: Normal range of motion.      Comments: Tenderness to palpation over the left lateral chest wall in the midaxillary line.  No bruising   Skin:     General: Skin is warm and dry.      Findings: No rash.   Neurological:      Mental Status: He is alert and oriented to person, place, and time.      Gait: Gait normal.   Psychiatric:         Mood and Affect: Mood normal.         Behavior: Behavior normal.         Assessment & Plan   Diagnoses and all orders for this visit:    1. Closed fracture of multiple ribs of left side with routine healing, subsequent encounter (Primary)  -     Cancel: XR Ribs Left With PA Chest  -     XR ribs 2 vw left      I repeated x-rays of his left ribs today.  Rib fractures are redemonstrated.  They are mildly displaced.  There is not seem to be evidence of much callus formation yet.  Clinically, he is doing a lot better.  Hardly having any pain.  Able to resume most normal activities.  I did explain to him that it may take up to 3 months or even a bit longer before the bones are fully healed.  During that time he should definitely minimize any activities that would jar his upper body or cause him to have to grab onto something and pull real hard.  We will plan to see him again by the end of " the year.  I expect at this point he will continue to have slow and steady improvement of his left rib pain.  If he has any problems, let me know and I will recheck him.  Overall, it seems that he is doing well enough that we do not need to keep bringing him back but can just rely on his own assessment at home.  I will follow-up with him again when the radiologist official reading is available.  Overall, I spent 20 minutes on the day of service with Avila and his wife discussing all the above.      Call with any problems or concerns before next visit       Return in about 8 months (around 11/15/2023).      Much of this report is an electronic transcription of spoken language to printed text using Dragon dictation software.  As such, the subtleties and finesse of spoken language may permit erroneous, or at times, nonsensical words or phrases to be inadvertently transcribed; thus changes may be made at a later date to rectify these errors.     Mary Barlow MD3/15/[id number]:55 EDT  This note has been electronically signed

## 2023-11-15 ENCOUNTER — OFFICE VISIT (OUTPATIENT)
Dept: FAMILY MEDICINE CLINIC | Facility: CLINIC | Age: 68
End: 2023-11-15
Payer: MEDICARE

## 2023-11-15 VITALS
HEART RATE: 60 BPM | TEMPERATURE: 97.3 F | BODY MASS INDEX: 27.61 KG/M2 | RESPIRATION RATE: 18 BRPM | DIASTOLIC BLOOD PRESSURE: 69 MMHG | WEIGHT: 197.2 LBS | OXYGEN SATURATION: 95 % | HEIGHT: 71 IN | SYSTOLIC BLOOD PRESSURE: 145 MMHG

## 2023-11-15 DIAGNOSIS — Z11.59 NEED FOR HEPATITIS C SCREENING TEST: ICD-10-CM

## 2023-11-15 DIAGNOSIS — Z13.220 SCREENING FOR CHOLESTEROL LEVEL: ICD-10-CM

## 2023-11-15 DIAGNOSIS — N40.1 BENIGN PROSTATIC HYPERPLASIA WITH URINARY OBSTRUCTION: Primary | ICD-10-CM

## 2023-11-15 DIAGNOSIS — J30.1 SEASONAL ALLERGIC RHINITIS DUE TO POLLEN: ICD-10-CM

## 2023-11-15 DIAGNOSIS — Z13.1 SCREENING FOR DIABETES MELLITUS: ICD-10-CM

## 2023-11-15 DIAGNOSIS — R03.0 ELEVATED BLOOD PRESSURE READING WITHOUT DIAGNOSIS OF HYPERTENSION: ICD-10-CM

## 2023-11-15 DIAGNOSIS — N13.8 BENIGN PROSTATIC HYPERPLASIA WITH URINARY OBSTRUCTION: Primary | ICD-10-CM

## 2023-11-15 DIAGNOSIS — Z13.0 SCREENING, ANEMIA, DEFICIENCY, IRON: ICD-10-CM

## 2023-11-15 PROCEDURE — 1159F MED LIST DOCD IN RCRD: CPT | Performed by: FAMILY MEDICINE

## 2023-11-15 PROCEDURE — 1160F RVW MEDS BY RX/DR IN RCRD: CPT | Performed by: FAMILY MEDICINE

## 2023-11-15 PROCEDURE — 99214 OFFICE O/P EST MOD 30 MIN: CPT | Performed by: FAMILY MEDICINE

## 2023-11-15 NOTE — PROGRESS NOTES
Subjective   Avila Pimentel is a 68 y.o. male.   Chief Complaint   Patient presents with    Hypertension    Benign Prostatic Hypertrophy       History of Present Illness   Presents to the office today for follow-up on chronic medical problems for active problem list as below.  I saw him several times between February and March following a fall where he had significant injuries including rib fractures.  Some residual right low back pain.  Very episodic in nature.  Sometimes will be stiff and minimally sore.  There will be spells when it really becomes painful for few seconds and then stops.      He has a history of elevated blood pressures but no diagnosis of hypertension.  Does not take any antihypertensive medicines.  He has BPH and follows with Dr. Kincaid.  He checks his PSA.  He is on Hytrin.  Otherwise he takes some allergy medicines.    Otherwise he feels good.  No chest pains, shortness of breath, nausea, vomiting, diarrhea.  He had a colonoscopy in 2021 by Dr. Null.      Patient Active Problem List    Diagnosis Date Noted    Fall down stairs 02/21/2023    Closed nondisplaced fracture of body of left scapula 02/21/2023    Bladder problem 03/23/2021     Note Last Updated: 3/23/2021     Treated by DR. Kincaid - rare problem - fat inside the body where it's not supposed to be.      FH: prostate cancer 03/23/2021     Note Last Updated: 3/23/2021     Father and brother.      Seasonal allergic rhinitis due to pollen 03/23/2021    Elevated blood pressure reading without diagnosis of hypertension 03/23/2021    Benign prostatic hyperplasia with urinary obstruction 11/08/2013     Note Last Updated: 3/23/2021     Yearly checks with DR. Kincaid - last PSA was 2.7.    Had a biopsy once - it was negative.             Past Surgical History:   Procedure Laterality Date    BLADDER SURGERY      COLONOSCOPY W/ BIOPSIES  05/21/2021    Dr. Null - diverticula, polyp (tubular adenomaon path), scarring - descending colon.  int &  "Ext hemorrhoids    VASECTOMY  1985     Current Outpatient Medications on File Prior to Visit   Medication Sig    levocetirizine (XYZAL) 5 MG tablet Take 1 tablet by mouth Every Evening.    multivitamin with minerals tablet tablet Take 1 tablet by mouth Daily.    terazosin (HYTRIN) 5 MG capsule Take 1 capsule by mouth Every Night.    [DISCONTINUED] guaiFENesin (Mucinex) 600 MG 12 hr tablet Take 1 tablet by mouth 2 (Two) Times a Day.     No current facility-administered medications on file prior to visit.     No Known Allergies  Social History     Socioeconomic History    Marital status:    Tobacco Use    Smoking status: Never     Passive exposure: Never    Smokeless tobacco: Never   Vaping Use    Vaping Use: Never used   Substance and Sexual Activity    Alcohol use: Never    Drug use: Never    Sexual activity: Yes     Partners: Female     Birth control/protection: Post-menopausal     Family History   Problem Relation Age of Onset    Cancer Father         Prostate    Cancer Brother         Prostate       Review of Systems    Objective   /69 (BP Location: Right arm, Patient Position: Sitting, Cuff Size: Large Adult)   Pulse 60   Temp 97.3 °F (36.3 °C) (Infrared)   Resp 18   Ht 180.3 cm (70.98\")   Wt 89.4 kg (197 lb 3.2 oz)   SpO2 95%   BMI 27.52 kg/m²   Physical Exam  Constitutional:       Appearance: He is well-developed. He is not toxic-appearing.      Comments:      HENT:      Head: Normocephalic and atraumatic.   Eyes:      Conjunctiva/sclera: Conjunctivae normal.   Cardiovascular:      Rate and Rhythm: Normal rate and regular rhythm.      Heart sounds: No murmur heard.  Pulmonary:      Effort: Pulmonary effort is normal. No respiratory distress.      Breath sounds: Normal breath sounds.   Musculoskeletal:         General: Normal range of motion.      Cervical back: Normal range of motion.      Right lower leg: No edema.      Left lower leg: No edema.      Comments: No specific areas of " tenderness to palpation over the right lower back.  No current palpable muscle tone.  He is able to demonstrate actually very good range of motion of his low back.  Pain does not radiate down either leg.     Skin:     General: Skin is warm and dry.      Findings: No rash.   Neurological:      Mental Status: He is alert and oriented to person, place, and time.   Psychiatric:         Behavior: Behavior normal.         Assessment & Plan   Diagnoses and all orders for this visit:    1. Benign prostatic hyperplasia with urinary obstruction (Primary)    2. Screening for cholesterol level  -     Lipid Panel; Future    3. Screening for diabetes mellitus  -     Comprehensive Metabolic Panel; Future    4. Need for hepatitis C screening test  -     Hepatitis C Antibody; Future    5. Screening, anemia, deficiency, iron  -     CBC & Differential; Future    6. Elevated blood pressure reading without diagnosis of hypertension    7. Seasonal allergic rhinitis due to pollen    BPH is a chronic problem currently under good control.  No lightheadedness from Hytrin.  Blood pressure today is acceptable at 145/69.  He reports it is a little lower than that at home, as well.  No changes there.    He is screened for prostate cancer with PSAs by his urologist.  To my knowledge he has not had a lipid panel.  Blood sugar was 132 and he had a bad fall and was treated at [facility] back in February.  He is current on colon cancer screening.  He does not smoke.  I recommended he continue his current medicines at current doses.  We will screen him for diabetes, high cholesterol, hepatitis C, anemia with lab work as above.  He is going to come back some morning when he is fasting and do those.  Allergies are well controlled on levocetirizine.  Continue that at current dose.  I will follow-up with him again in 1 year.  I will also follow-up with him when the blood work is back.      Call with any problems or concerns before next visit       Return in  about 1 year (around 11/15/2024), or PLUS labs in the next week or two.      Much of this report is an electronic transcription of spoken language to printed text using Dragon dictation software.  As such, the subtleties and finesse of spoken language may permit erroneous, or at times, nonsensical words or phrases to be inadvertently transcribed; thus changes may be made at a later date to rectify these errors.     Mary Barlow MD11/15/[id number]:16 EST  This note has been electronically signed

## 2023-11-18 LAB
ALBUMIN SERPL-MCNC: 4.3 G/DL (ref 3.9–4.9)
ALBUMIN/GLOB SERPL: 1.6 {RATIO} (ref 1.2–2.2)
ALP SERPL-CCNC: 85 IU/L (ref 44–121)
ALT SERPL-CCNC: 19 IU/L (ref 0–44)
AST SERPL-CCNC: 21 IU/L (ref 0–40)
BASOPHILS # BLD AUTO: 0 X10E3/UL (ref 0–0.2)
BASOPHILS NFR BLD AUTO: 0 %
BILIRUB SERPL-MCNC: 0.5 MG/DL (ref 0–1.2)
BUN SERPL-MCNC: 27 MG/DL (ref 8–27)
BUN/CREAT SERPL: 25 (ref 10–24)
CALCIUM SERPL-MCNC: 9.4 MG/DL (ref 8.6–10.2)
CHLORIDE SERPL-SCNC: 104 MMOL/L (ref 96–106)
CO2 SERPL-SCNC: 23 MMOL/L (ref 20–29)
CREAT SERPL-MCNC: 1.07 MG/DL (ref 0.76–1.27)
EGFRCR SERPLBLD CKD-EPI 2021: 76 ML/MIN/1.73
EOSINOPHIL # BLD AUTO: 0.1 X10E3/UL (ref 0–0.4)
EOSINOPHIL NFR BLD AUTO: 1 %
ERYTHROCYTE [DISTWIDTH] IN BLOOD BY AUTOMATED COUNT: 12.4 % (ref 11.6–15.4)
GLOBULIN SER CALC-MCNC: 2.7 G/DL (ref 1.5–4.5)
GLUCOSE SERPL-MCNC: 95 MG/DL (ref 70–99)
HCT VFR BLD AUTO: 46.9 % (ref 37.5–51)
HCV IGG SERPL QL IA: NON REACTIVE
HGB BLD-MCNC: 15.9 G/DL (ref 13–17.7)
IMM GRANULOCYTES # BLD AUTO: 0 X10E3/UL (ref 0–0.1)
IMM GRANULOCYTES NFR BLD AUTO: 0 %
LYMPHOCYTES # BLD AUTO: 1.9 X10E3/UL (ref 0.7–3.1)
LYMPHOCYTES NFR BLD AUTO: 27 %
MCH RBC QN AUTO: 29.9 PG (ref 26.6–33)
MCHC RBC AUTO-ENTMCNC: 33.9 G/DL (ref 31.5–35.7)
MCV RBC AUTO: 88 FL (ref 79–97)
MONOCYTES # BLD AUTO: 0.5 X10E3/UL (ref 0.1–0.9)
MONOCYTES NFR BLD AUTO: 7 %
NEUTROPHILS # BLD AUTO: 4.5 X10E3/UL (ref 1.4–7)
NEUTROPHILS NFR BLD AUTO: 65 %
PLATELET # BLD AUTO: 266 X10E3/UL (ref 150–450)
POTASSIUM SERPL-SCNC: 4.1 MMOL/L (ref 3.5–5.2)
PROT SERPL-MCNC: 7 G/DL (ref 6–8.5)
RBC # BLD AUTO: 5.31 X10E6/UL (ref 4.14–5.8)
SODIUM SERPL-SCNC: 141 MMOL/L (ref 134–144)
SPECIMEN STATUS: NORMAL
WBC # BLD AUTO: 7 X10E3/UL (ref 3.4–10.8)

## 2024-10-02 ENCOUNTER — OFFICE VISIT (OUTPATIENT)
Dept: FAMILY MEDICINE CLINIC | Facility: CLINIC | Age: 69
End: 2024-10-02
Payer: MEDICARE

## 2024-10-02 VITALS
BODY MASS INDEX: 27.47 KG/M2 | WEIGHT: 196.2 LBS | HEART RATE: 66 BPM | TEMPERATURE: 97.7 F | RESPIRATION RATE: 18 BRPM | DIASTOLIC BLOOD PRESSURE: 80 MMHG | OXYGEN SATURATION: 95 % | SYSTOLIC BLOOD PRESSURE: 173 MMHG | HEIGHT: 71 IN

## 2024-10-02 DIAGNOSIS — R03.0 ELEVATED BLOOD PRESSURE READING WITHOUT DIAGNOSIS OF HYPERTENSION: ICD-10-CM

## 2024-10-02 DIAGNOSIS — Z00.00 PHYSICAL EXAM, ANNUAL: Primary | ICD-10-CM

## 2024-10-02 PROCEDURE — 1170F FXNL STATUS ASSESSED: CPT | Performed by: FAMILY MEDICINE

## 2024-10-02 PROCEDURE — 1125F AMNT PAIN NOTED PAIN PRSNT: CPT | Performed by: FAMILY MEDICINE

## 2024-10-02 PROCEDURE — G0439 PPPS, SUBSEQ VISIT: HCPCS | Performed by: FAMILY MEDICINE

## 2024-10-02 NOTE — PROGRESS NOTES
Subjective   The ABCs of the Annual Wellness Visit  Medicare Wellness Visit      Avila Pimentel is a 69 y.o. patient who presents for a Medicare Wellness Visit.    The following portions of the patient's history were reviewed and   updated as appropriate: allergies, current medications, past family history, past medical history, past social history, past surgical history, and problem list.    Compared to one year ago, the patient's physical   health is the same.  Compared to one year ago, the patient's mental   health is the same.    Recent Hospitalizations:  He was not admitted to the hospital during the last year.     Current Medical Providers:  Patient Care Team:  Mary Barlow MD as PCP - General (Family Medicine)  Enio Kincaid MD as Consulting Physician (Urology)  Scarlet Jacome OD (Optometry)  Peyton Bruce DO (Optometry)    Outpatient Medications Prior to Visit   Medication Sig Dispense Refill    levocetirizine (XYZAL) 5 MG tablet Take 1 tablet by mouth Every Evening.      multivitamin with minerals tablet tablet Take 1 tablet by mouth Daily.      terazosin (HYTRIN) 5 MG capsule Take 1 capsule by mouth Every Night.       No facility-administered medications prior to visit.     No opioid medication identified on active medication list. I have reviewed chart for other potential  high risk medication/s and harmful drug interactions in the elderly.      Aspirin is not on active medication list.  Aspirin use is not indicated based on review of current medical condition/s. Risk of harm outweighs potential benefits.  .    Patient Active Problem List   Diagnosis    Benign prostatic hyperplasia with urinary obstruction    Bladder problem    FH: prostate cancer    Seasonal allergic rhinitis due to pollen    Elevated blood pressure reading without diagnosis of hypertension    Fall down stairs    Closed nondisplaced fracture of body of left scapula     Advance Care Planning Advance Directive  "is not on file.  ACP discussion was held with the patient during this visit. Patient does not have an advance directive, information provided.            Objective   Vitals:    10/02/24 1451   BP: 173/80   BP Location: Right arm   Patient Position: Sitting   Cuff Size: Adult   Pulse: 66   Resp: 18   Temp: 97.7 °F (36.5 °C)   TempSrc: Infrared   SpO2: 95%   Weight: 89 kg (196 lb 3.2 oz)   Height: 180.3 cm (70.98\")       Estimated body mass index is 27.38 kg/m² as calculated from the following:    Height as of this encounter: 180.3 cm (70.98\").    Weight as of this encounter: 89 kg (196 lb 3.2 oz).    BMI is >= 25 and <30. (Overweight) The following options were offered after discussion;: exercise counseling/recommendations and nutrition counseling/recommendations       Does the patient have evidence of cognitive impairment? No                                                                                                Health  Risk Assessment    Smoking Status:  Social History     Tobacco Use   Smoking Status Never    Passive exposure: Never   Smokeless Tobacco Never     Alcohol Consumption:  Social History     Substance and Sexual Activity   Alcohol Use Never       Fall Risk Screen  STEADI Fall Risk Assessment was completed, and patient is at LOW risk for falls.Assessment completed on:10/2/2024    Depression Screening:      10/2/2024     2:50 PM   PHQ-2/PHQ-9 Depression Screening   Little Interest or Pleasure in Doing Things 0-->not at all   Feeling Down, Depressed or Hopeless 0-->not at all   PHQ-9: Brief Depression Severity Measure Score 0     Health Habits and Functional and Cognitive Screening:      10/2/2024     2:47 PM   Functional & Cognitive Status   Do you have difficulty preparing food and eating? No   Do you have difficulty bathing yourself, getting dressed or grooming yourself? No   Do you have difficulty using the toilet? No   Do you have difficulty moving around from place to place? No   Do you have " trouble with steps or getting out of a bed or a chair? No   Current Diet Well Balanced Diet   Dental Exam Up to date   Eye Exam Not up to date   Exercise (times per week) 0 times per week   Current Exercises Include No Regular Exercise   Do you need help using the phone?  No   Are you deaf or do you have serious difficulty hearing?  Yes   Do you need help to go to places out of walking distance? No   Do you need help shopping? No   Do you need help preparing meals?  No   Do you need help with housework?  No   Do you need help with laundry? No   Do you need help taking your medications? No   Do you need help managing money? No   Do you ever drive or ride in a car without wearing a seat belt? No   Have you felt unusual stress, anger or loneliness in the last month? No   Who do you live with? Spouse   If you need help, do you have trouble finding someone available to you? No   Have you been bothered in the last four weeks by sexual problems? No   Do you have difficulty concentrating, remembering or making decisions? Yes           Age-appropriate Screening Schedule:  Refer to the list below for future screening recommendations based on patient's age, sex and/or medical conditions. Orders for these recommended tests are listed in the plan section. The patient has been provided with a written plan.    Health Maintenance List  Health Maintenance   Topic Date Due    TDAP/TD VACCINES (1 - Tdap) Never done    ZOSTER VACCINE (1 of 2) Never done    Pneumococcal Vaccine 65+ (1 of 1 - PCV) Never done    BMI FOLLOWUP  03/01/2024    COVID-19 Vaccine (4 - 2023-24 season) 09/01/2024    INFLUENZA VACCINE  03/31/2025 (Originally 8/1/2024)    ANNUAL WELLNESS VISIT  10/02/2025    COLORECTAL CANCER SCREENING  05/21/2028    HEPATITIS C SCREENING  Completed                                                                                                                                                CMS Preventative Services Quick  "Reference  Risk Factors Identified During Encounter  Immunizations Discussed/Encouraged: Influenza, Prevnar 20 (Pneumococcal 20-valent conjugate), and Shingrix    The above risks/problems have been discussed with the patient.  Pertinent information has been shared with the patient in the After Visit Summary.  An After Visit Summary and PPPS were made available to the patient.    Follow Up:   Next Medicare Wellness visit to be scheduled in 1 year.         Additional E&M Note during same encounter follows:  Patient has additional, significant, and separately identifiable condition(s)/problem(s) that require work above and beyond the Medicare Wellness Visit     Chief Complaint  Medicare Wellness-Initial Visit    Subjective    HPI  Avila is also being seen today for additional medical problem/s.       The patient presents for a Medicare wellness visit.    He reports that his health status remains consistent with the previous year, with no hospitalizations in 2023.    He experienced a fall in late January or early February of 2023, which involved descending approximately 15 to 16 steps.    His blood pressure tends to be slightly elevated. He does not take aspirin.    He recalls receiving influenza vaccines in the past but associates them with subsequent coughing and illness throughout the winter season. He has not received an influenza vaccine recently and is uncertain if he has contracted influenza.    He does not have an advanced directive or living will. He has an appointment scheduled with Dr. Ocampo in January 2025, during which a PSA test is typically conducted.          Objective   Vital Signs:  /80 (BP Location: Right arm, Patient Position: Sitting, Cuff Size: Adult)   Pulse 66   Temp 97.7 °F (36.5 °C) (Infrared)   Resp 18   Ht 180.3 cm (70.98\")   Wt 89 kg (196 lb 3.2 oz)   SpO2 95%   BMI 27.38 kg/m²   Physical Exam  Constitutional:       Appearance: He is well-developed.      Comments:      SIA: "      Head: Normocephalic and atraumatic.   Eyes:      Conjunctiva/sclera: Conjunctivae normal.   Cardiovascular:      Rate and Rhythm: Normal rate.   Pulmonary:      Effort: Pulmonary effort is normal.   Musculoskeletal:         General: Normal range of motion.      Cervical back: Normal range of motion.   Skin:     General: Skin is warm and dry.      Findings: No rash.   Neurological:      Mental Status: He is alert and oriented to person, place, and time.   Psychiatric:         Behavior: Behavior normal.           Vital Signs  Blood pressure measures at 173.             Assessment and Plan           1. Medicare wellness visit.  His cognitive assessment results are within normal limits. From a preventive standpoint, screenings for prostate cancer, colon cancer, high cholesterol, and diabetes are due. His last colonoscopy was performed in 2021, and a follow-up colonoscopy is scheduled for 2028. His vital signs are generally within normal range, with the exception of an elevated blood pressure reading of 173, which could be attributed to whitecoat syndrome, as his previous reading was 145 and his weight has remained stable over the past year. A booklet on advanced directives was provided for him to complete and return. Blood work is scheduled for November 2024, which will include screenings for prostate cancer, cholesterol, and diabetes. The PSA test will be conducted by Urology. An influenza vaccine was recommended for November 2024.    Follow-up  Return in November 2024 for follow up.    No orders of the defined types were placed in this encounter.            Follow Up   No follow-ups on file.  Patient was given instructions and counseling regarding his condition or for health maintenance advice. Please see specific information pulled into the AVS if appropriate.  Patient or patient representative verbalized consent for the use of Ambient Listening during the visit with  Mary Barlow MD for chart  documentation. 10/2/2024  16:46 EDT

## 2024-11-15 ENCOUNTER — OFFICE VISIT (OUTPATIENT)
Dept: FAMILY MEDICINE CLINIC | Facility: CLINIC | Age: 69
End: 2024-11-15
Payer: MEDICARE

## 2024-11-15 VITALS
DIASTOLIC BLOOD PRESSURE: 83 MMHG | RESPIRATION RATE: 18 BRPM | WEIGHT: 193.8 LBS | HEART RATE: 68 BPM | BODY MASS INDEX: 27.13 KG/M2 | SYSTOLIC BLOOD PRESSURE: 169 MMHG | OXYGEN SATURATION: 96 % | TEMPERATURE: 96.8 F | HEIGHT: 71 IN

## 2024-11-15 DIAGNOSIS — N13.8 BENIGN PROSTATIC HYPERPLASIA WITH URINARY OBSTRUCTION: ICD-10-CM

## 2024-11-15 DIAGNOSIS — Z13.0 SCREENING, ANEMIA, DEFICIENCY, IRON: ICD-10-CM

## 2024-11-15 DIAGNOSIS — N40.1 BENIGN PROSTATIC HYPERPLASIA WITH URINARY OBSTRUCTION: ICD-10-CM

## 2024-11-15 DIAGNOSIS — R03.0 ELEVATED BLOOD PRESSURE READING WITHOUT DIAGNOSIS OF HYPERTENSION: Primary | ICD-10-CM

## 2024-11-15 DIAGNOSIS — Z83.430 FAMILY HISTORY OF ELEVATED LIPOPROTEIN(A): ICD-10-CM

## 2024-11-15 DIAGNOSIS — Z13.1 SCREENING FOR DIABETES MELLITUS: ICD-10-CM

## 2024-11-15 PROCEDURE — 1125F AMNT PAIN NOTED PAIN PRSNT: CPT | Performed by: FAMILY MEDICINE

## 2024-11-15 PROCEDURE — 1159F MED LIST DOCD IN RCRD: CPT | Performed by: FAMILY MEDICINE

## 2024-11-15 PROCEDURE — 1160F RVW MEDS BY RX/DR IN RCRD: CPT | Performed by: FAMILY MEDICINE

## 2024-11-15 PROCEDURE — 99214 OFFICE O/P EST MOD 30 MIN: CPT | Performed by: FAMILY MEDICINE

## 2024-11-15 NOTE — PROGRESS NOTES
Subjective   Avila Pimentel is a 69 y.o. male.   Chief Complaint   Patient presents with    Benign Prostatic Hypertrophy       History of Present Illness   69 y.o. male with past medical history of BPH, elevated blood pressures without diagnosis of hypertension presents to the office today to follow-up.  He has Medicare wellness couple months ago.  He saw Dr. Kincaid in January of this year.  We did blood work a year ago to screen him for diabetes and anemia.    He had a colonoscopy in .  PSA levels are followed by Dr. Kincaid.    History of Present Illness  The patient is a 69-year-old male who presents for a wellness visit.    He reports a slight decrease in his blood pressure, but it remains elevated. He does not monitor his blood pressure at home.    He mentions that he did not have a cholesterol blood test last year due to insurance coverage issues. His cholesterol levels were slightly elevated when last checked a few years ago. He is scheduled to have his PSA levels checked by Dr. Kincaid after the start of the new year.    He reports no anxiety.    FAMILY HISTORY  He has a family history of high cholesterol. His father  several years ago. When he was younger, he was a heavy drinker and smoker.      Patient Active Problem List    Diagnosis Date Noted    Fall down stairs 2023    Closed nondisplaced fracture of body of left scapula 2023    Bladder problem 2021     Note Last Updated: 3/23/2021     Treated by DR. Kincaid - rare problem - fat inside the body where it's not supposed to be.      FH: prostate cancer 2021     Note Last Updated: 3/23/2021     Father and brother.      Seasonal allergic rhinitis due to pollen 2021    Elevated blood pressure reading without diagnosis of hypertension 2021    Benign prostatic hyperplasia with urinary obstruction 2013     Note Last Updated: 3/23/2021     Yearly checks with DR. Kincaid - last PSA was 2.7.    Had a biopsy once -  "it was negative.             Past Surgical History:   Procedure Laterality Date    BLADDER SURGERY      COLONOSCOPY W/ BIOPSIES  05/21/2021    Dr. Null - diverticula, polyp (tubular adenomaon path), scarring - descending colon.  int & Ext hemorrhoids    VASECTOMY  1985     Current Outpatient Medications on File Prior to Visit   Medication Sig    levocetirizine (XYZAL) 5 MG tablet Take 1 tablet by mouth Every Evening.    multivitamin with minerals tablet tablet Take 1 tablet by mouth Daily.    terazosin (HYTRIN) 5 MG capsule Take 1 capsule by mouth Every Night.     No current facility-administered medications on file prior to visit.     No Known Allergies  Social History     Socioeconomic History    Marital status:    Tobacco Use    Smoking status: Never     Passive exposure: Never    Smokeless tobacco: Never   Vaping Use    Vaping status: Never Used   Substance and Sexual Activity    Alcohol use: Never    Drug use: Never    Sexual activity: Yes     Partners: Female     Birth control/protection: Post-menopausal     Family History   Problem Relation Age of Onset    Cancer Father         Prostate    Cancer Brother         Prostate       Review of Systems    Objective   /83 (BP Location: Right arm, Patient Position: Sitting, Cuff Size: Large Adult)   Pulse 68   Temp 96.8 °F (36 °C) (Infrared)   Resp 18   Ht 180.3 cm (70.98\")   Wt 87.9 kg (193 lb 12.8 oz)   SpO2 96%   BMI 27.04 kg/m²   Physical Exam  Constitutional:       General: He is not in acute distress.     Appearance: He is well-developed.      Comments:      HENT:      Head: Normocephalic and atraumatic.   Eyes:      Conjunctiva/sclera: Conjunctivae normal.   Cardiovascular:      Rate and Rhythm: Normal rate and regular rhythm.      Heart sounds: No murmur heard.  Pulmonary:      Effort: Pulmonary effort is normal. No respiratory distress.   Musculoskeletal:         General: Normal range of motion.      Cervical back: Normal range of motion. "      Right lower leg: No edema.      Left lower leg: No edema.   Skin:     General: Skin is warm and dry.      Findings: No rash.   Neurological:      Mental Status: He is alert and oriented to person, place, and time.   Psychiatric:         Mood and Affect: Mood normal.         Behavior: Behavior normal.       Physical Exam  Heart has a normal rhythm. No murmur.      No visits with results within 4 Month(s) from this visit.   Latest known visit with results is:   Office Visit on 11/15/2023   Component Date Value Ref Range Status    WBC 11/17/2023 7.0  3.4 - 10.8 x10E3/uL Final    RBC 11/17/2023 5.31  4.14 - 5.80 x10E6/uL Final    Hemoglobin 11/17/2023 15.9  13.0 - 17.7 g/dL Final    Hematocrit 11/17/2023 46.9  37.5 - 51.0 % Final    MCV 11/17/2023 88  79 - 97 fL Final    MCH 11/17/2023 29.9  26.6 - 33.0 pg Final    MCHC 11/17/2023 33.9  31.5 - 35.7 g/dL Final    RDW 11/17/2023 12.4  11.6 - 15.4 % Final    Platelets 11/17/2023 266  150 - 450 x10E3/uL Final    Neutrophil Rel % 11/17/2023 65  Not Estab. % Final    Lymphocyte Rel % 11/17/2023 27  Not Estab. % Final    Monocyte Rel % 11/17/2023 7  Not Estab. % Final    Eosinophil Rel % 11/17/2023 1  Not Estab. % Final    Basophil Rel % 11/17/2023 0  Not Estab. % Final    Neutrophils Absolute 11/17/2023 4.5  1.4 - 7.0 x10E3/uL Final    Lymphocytes Absolute 11/17/2023 1.9  0.7 - 3.1 x10E3/uL Final    Monocytes Absolute 11/17/2023 0.5  0.1 - 0.9 x10E3/uL Final    Eosinophils Absolute 11/17/2023 0.1  0.0 - 0.4 x10E3/uL Final    Basophils Absolute 11/17/2023 0.0  0.0 - 0.2 x10E3/uL Final    Immature Granulocyte Rel % 11/17/2023 0  Not Estab. % Final    Immature Grans Absolute 11/17/2023 0.0  0.0 - 0.1 x10E3/uL Final    Hep C Virus Ab 11/17/2023 Non Reactive  Non Reactive Final    Comment: HCV antibody alone does not differentiate between previously  resolved infection and active infection. Equivocal and Reactive  HCV antibody results should be followed up with an HCV RNA  test  to support the diagnosis of active HCV infection.      Glucose 11/17/2023 95  70 - 99 mg/dL Final    BUN 11/17/2023 27  8 - 27 mg/dL Final    Creatinine 11/17/2023 1.07  0.76 - 1.27 mg/dL Final    EGFR Result 11/17/2023 76  >59 mL/min/1.73 Final    BUN/Creatinine Ratio 11/17/2023 25 (H)  10 - 24 Final    Sodium 11/17/2023 141  134 - 144 mmol/L Final    Potassium 11/17/2023 4.1  3.5 - 5.2 mmol/L Final    Chloride 11/17/2023 104  96 - 106 mmol/L Final    Total CO2 11/17/2023 23  20 - 29 mmol/L Final    Calcium 11/17/2023 9.4  8.6 - 10.2 mg/dL Final    Total Protein 11/17/2023 7.0  6.0 - 8.5 g/dL Final    Albumin 11/17/2023 4.3  3.9 - 4.9 g/dL Final    Globulin 11/17/2023 2.7  1.5 - 4.5 g/dL Final    A/G Ratio 11/17/2023 1.6  1.2 - 2.2 Final    Total Bilirubin 11/17/2023 0.5  0.0 - 1.2 mg/dL Final    Alkaline Phosphatase 11/17/2023 85  44 - 121 IU/L Final    AST (SGOT) 11/17/2023 21  0 - 40 IU/L Final    ALT (SGPT) 11/17/2023 19  0 - 44 IU/L Final    Specimen Status 11/17/2023 Comment   Final    Comment: ABN Option 3  ABN Option 3  One or more tests were removed at the request of the patient and may  not be represented on this report. As a result, some or all of the  tests originally requested may not have been performed or may be  reported separately. Please contact your patient regarding any  necessary follow-up.       Results            Assessment & Plan   Diagnoses and all orders for this visit:    1. Elevated blood pressure reading without diagnosis of hypertension (Primary)  -     Lipid Panel  -     CBC & Differential  -     Comprehensive Metabolic Panel    2. Screening for diabetes mellitus    3. Screening, anemia, deficiency, iron    4. Benign prostatic hyperplasia with urinary obstruction    5. Family history of elevated lipoprotein(a)  -     Lipid Panel      Assessment & Plan  1. Elevated blood pressure.  His blood pressure readings have been consistently close to 170 systolic over the past month and a  half. He was advised to purchase a home blood pressure monitor and check his blood pressure daily. He should report the readings to the office in a week. If his blood pressure readings are consistently high, medication will be initiated, and a follow-up appointment will be scheduled in the spring to monitor his blood pressure.    2. Health Maintenance.  Blood work will be ordered to screen for diabetes, cholesterol, kidney function, and anemia. If all results are within normal limits, the next wellness visit will be scheduled for the same time next year. Screening for prostate cancer will be handled by Dr. Kincaid after the first of the year.    Follow-up  Call in a week with blood pressure readings.        Call with any problems or concerns before next visit       Return in about 1 year (around 11/15/2025), or for Medicare Wellness.  Patient or patient representative verbalized consent for the use of Ambient Listening during the visit with  Mary Barlow MD for chart documentation. 11/15/2024  16:39 EST    Part of this note may be an electronic transcription/translation of spoken language to printed text using the Dragon Dictation System    Mary Barlow MD11/15/[id number]:39 EST  This note has been electronically signed

## 2024-11-16 LAB
ALBUMIN SERPL-MCNC: 4.6 G/DL (ref 3.9–4.9)
ALP SERPL-CCNC: 85 IU/L (ref 44–121)
ALT SERPL-CCNC: 22 IU/L (ref 0–44)
AST SERPL-CCNC: 21 IU/L (ref 0–40)
BASOPHILS # BLD AUTO: 0 X10E3/UL (ref 0–0.2)
BASOPHILS NFR BLD AUTO: 0 %
BILIRUB SERPL-MCNC: 0.4 MG/DL (ref 0–1.2)
BUN SERPL-MCNC: 28 MG/DL (ref 8–27)
BUN/CREAT SERPL: 32 (ref 10–24)
CALCIUM SERPL-MCNC: 9.7 MG/DL (ref 8.6–10.2)
CHLORIDE SERPL-SCNC: 104 MMOL/L (ref 96–106)
CHOLEST SERPL-MCNC: 181 MG/DL (ref 100–199)
CO2 SERPL-SCNC: 24 MMOL/L (ref 20–29)
CREAT SERPL-MCNC: 0.87 MG/DL (ref 0.76–1.27)
EGFRCR SERPLBLD CKD-EPI 2021: 93 ML/MIN/1.73
EOSINOPHIL # BLD AUTO: 0.1 X10E3/UL (ref 0–0.4)
EOSINOPHIL NFR BLD AUTO: 1 %
ERYTHROCYTE [DISTWIDTH] IN BLOOD BY AUTOMATED COUNT: 11.9 % (ref 11.6–15.4)
GLOBULIN SER CALC-MCNC: 2.8 G/DL (ref 1.5–4.5)
GLUCOSE SERPL-MCNC: 97 MG/DL (ref 70–99)
HCT VFR BLD AUTO: 48.7 % (ref 37.5–51)
HDLC SERPL-MCNC: 42 MG/DL
HGB BLD-MCNC: 16.7 G/DL (ref 13–17.7)
IMM GRANULOCYTES # BLD AUTO: 0 X10E3/UL (ref 0–0.1)
IMM GRANULOCYTES NFR BLD AUTO: 0 %
LDLC SERPL CALC-MCNC: 102 MG/DL (ref 0–99)
LYMPHOCYTES # BLD AUTO: 1.9 X10E3/UL (ref 0.7–3.1)
LYMPHOCYTES NFR BLD AUTO: 26 %
MCH RBC QN AUTO: 31 PG (ref 26.6–33)
MCHC RBC AUTO-ENTMCNC: 34.3 G/DL (ref 31.5–35.7)
MCV RBC AUTO: 90 FL (ref 79–97)
MONOCYTES # BLD AUTO: 0.4 X10E3/UL (ref 0.1–0.9)
MONOCYTES NFR BLD AUTO: 6 %
NEUTROPHILS # BLD AUTO: 4.7 X10E3/UL (ref 1.4–7)
NEUTROPHILS NFR BLD AUTO: 67 %
PLATELET # BLD AUTO: 265 X10E3/UL (ref 150–450)
POTASSIUM SERPL-SCNC: 4.1 MMOL/L (ref 3.5–5.2)
PROT SERPL-MCNC: 7.4 G/DL (ref 6–8.5)
RBC # BLD AUTO: 5.39 X10E6/UL (ref 4.14–5.8)
SODIUM SERPL-SCNC: 141 MMOL/L (ref 134–144)
TRIGL SERPL-MCNC: 213 MG/DL (ref 0–149)
VLDLC SERPL CALC-MCNC: 37 MG/DL (ref 5–40)
WBC # BLD AUTO: 7.1 X10E3/UL (ref 3.4–10.8)

## 2024-12-06 ENCOUNTER — TELEPHONE (OUTPATIENT)
Dept: FAMILY MEDICINE CLINIC | Facility: CLINIC | Age: 69
End: 2024-12-06
Payer: MEDICARE

## 2024-12-06 NOTE — TELEPHONE ENCOUNTER
Please give Avila a call and let him know that his blood pressure readings still look okay.  Will not start medicines at this time.  I would recommend he continue to check his blood pressure once a week going forward.  If he has any questions, please let me know.  Thanks!